# Patient Record
Sex: FEMALE | Race: WHITE | NOT HISPANIC OR LATINO | Employment: FULL TIME | URBAN - METROPOLITAN AREA
[De-identification: names, ages, dates, MRNs, and addresses within clinical notes are randomized per-mention and may not be internally consistent; named-entity substitution may affect disease eponyms.]

---

## 2022-11-19 ENCOUNTER — OFFICE VISIT (OUTPATIENT)
Dept: URGENT CARE | Facility: URGENT CARE | Age: 57
End: 2022-11-19
Payer: COMMERCIAL

## 2022-11-19 VITALS
TEMPERATURE: 98.2 F | WEIGHT: 248.7 LBS | SYSTOLIC BLOOD PRESSURE: 154 MMHG | HEART RATE: 77 BPM | OXYGEN SATURATION: 95 % | RESPIRATION RATE: 16 BRPM | DIASTOLIC BLOOD PRESSURE: 94 MMHG

## 2022-11-19 DIAGNOSIS — J01.90 ACUTE SINUSITIS WITH SYMPTOMS > 10 DAYS: Primary | ICD-10-CM

## 2022-11-19 DIAGNOSIS — B37.31 YEAST INFECTION OF THE VAGINA: ICD-10-CM

## 2022-11-19 DIAGNOSIS — J45.20 MILD INTERMITTENT ASTHMA, UNSPECIFIED WHETHER COMPLICATED: ICD-10-CM

## 2022-11-19 PROCEDURE — 99203 OFFICE O/P NEW LOW 30 MIN: CPT | Performed by: PHYSICIAN ASSISTANT

## 2022-11-19 RX ORDER — MONTELUKAST SODIUM 10 MG/1
10 TABLET ORAL
COMMUNITY
Start: 2021-10-06

## 2022-11-19 RX ORDER — FLUCONAZOLE 150 MG/1
150 TABLET ORAL
Qty: 2 TABLET | Refills: 0 | Status: SHIPPED | OUTPATIENT
Start: 2022-11-19 | End: 2022-11-23

## 2022-11-19 RX ORDER — NYSTATIN 100000/ML
500000 SUSPENSION, ORAL (FINAL DOSE FORM) ORAL
COMMUNITY
Start: 2021-10-06

## 2022-11-19 RX ORDER — AZELASTINE 1 MG/ML
SPRAY, METERED NASAL
COMMUNITY
Start: 2022-10-28

## 2022-11-19 RX ORDER — FLUCONAZOLE 150 MG/1
TABLET ORAL
COMMUNITY
Start: 2022-08-23

## 2022-11-19 RX ORDER — ESCITALOPRAM OXALATE 10 MG/1
10 TABLET ORAL DAILY
COMMUNITY
Start: 2022-10-28

## 2022-11-19 RX ORDER — OMEPRAZOLE 40 MG/1
CAPSULE, DELAYED RELEASE ORAL
COMMUNITY
Start: 2022-09-28

## 2022-11-19 RX ORDER — SUMATRIPTAN 5 MG/1
5 SPRAY NASAL
COMMUNITY
Start: 2021-10-06

## 2022-11-19 RX ORDER — METRONIDAZOLE 250 MG/1
250 TABLET ORAL
COMMUNITY
Start: 2021-10-06

## 2022-11-19 ASSESSMENT — PAIN SCALES - GENERAL: PAINLEVEL: NO PAIN (0)

## 2022-11-19 NOTE — PROGRESS NOTES
ASSESSMENT/PLAN:     (J01.90) Acute sinusitis with symptoms > 10 days  (primary encounter diagnosis)    MDM: Acute on chronic sinusitis.     Plan: amoxicillin-clavulanate (AUGMENTIN) 875-125 MG         tablet    -Augmentin antibiotic x 14 days   -Follow-up with primary care provider if no improvement after 5 days of antibiotics, if any sudden worsening, or if not fully resolved in the next 14 days   -Continue asthma prevention medication and as needed Albuterol rescue medication. Seek re-evaluation if any worsening of wheezing/asthma or if inadequate response to Albuterol   -Educational handout is placed in Epic         (B37.31) Yeast infection of the vagina    Comment: Patient reports long history of needed Diflucan for yeast vaginitis with use of antibiotics. She reports need of 2 doses.     Plan: fluconazole (DIFLUCAN) 150 MG tablet    -Diflucan prescription is provided   -Follow-up with primary care if any ongoing symptoms     (J45.20) Mild intermittent asthma, unspecified whether complicated    Plan:     -As per above       -----------------------        SUBJECTIVE:   Jing Velazco is a 57 year old female, with a past medical history that includes asthma, allergies and frequent sinus infections presenting to urgent care today for evaluation of suspected bacterial sinus infection.     HPI: patient reports she has been having increased mold allergy symptoms and some increased asthma symptoms for many weeks duration. Over the past 2 weeks or so, patient reports she has also developed progressive, purulent nasal congestion, purulent rhinorrhea, post-nasal drainage,sinus pain/pressure, and on/off facial pressure.     Symptoms are reported to be worsening despite use of allergy medications, steamer, and  Nettipot.       RESP HISTORY: Positive for asthma. Patient states she has a well defined asthma plan/states she feels is responding well to her daily asthma medication and as needed Albuterol (which has been 2-3  times daily for past 2 weeks).       ROS: No associated coughing up of blood, blue lips/fingers/toes,  abdominal pain, nausea, vomiting, diarrhea, severe body aches, severe headaches (but does have waxing and waning sinus headache) or other acute illness symptoms.       Past Medical History:   Diagnosis Date     ASTHMA - MILD INTERMITTENT 11/15/2005     Contact dermatitis and other eczema, due to unspecified cause      Diarrhea      HEADACHE 11/15/2005     Migraine, unspecified, without mention of intractable migraine without mention of status migrainosus      Rosacea        Patient Active Problem List   Diagnosis     Headache     Mild intermittent asthma     CARDIOVASCULAR SCREENING; LDL GOAL LESS THAN 160       Current Outpatient Medications   Medication     ALBUTEROL 90 MCG/ACT IN AERS     azelastine (ASTELIN) 0.1 % nasal spray     escitalopram (LEXAPRO) 10 MG tablet     fluconazole (DIFLUCAN) 150 MG tablet     metroNIDAZOLE (FLAGYL) 250 MG tablet     montelukast (SINGULAIR) 10 MG tablet     nystatin (MYCOSTATIN) 914514 UNIT/ML suspension     omeprazole (PRILOSEC) 40 MG DR capsule     SUMAtriptan (IMITREX) 5 MG/ACT nasal spray     No current facility-administered medications for this visit.       Allergies   Allergen Reactions     No Known Allergies             OBJECTIVE:   BP (!) 154/94 (BP Location: Right arm, Patient Position: Chair, Cuff Size: Adult Large)   Pulse 77   Temp 98.2  F (36.8  C) (Tympanic)   Resp 16   Wt 112.8 kg (248 lb 11.2 oz)   SpO2 95%       General appearance: alert and no apparent distress   Skin color is uniform in color and without rash.   HEENT:   Conjunctiva not injected. Sclera clear.   Left TM is normal: no effusions, no erythema, and normal landmarks.   Right TM is normal: no effusions, no erythema, and normal landmarks.   Nasal mucosa is red and swollen. Maxillary and frontal sinuses are mildly tender to finger tap percussion bilaterally.   Oropharyngeal exam is normal other  than evidence of post-nasal drip: no lesions, erythema, adenopathy or exudate. Uvula is midline. No trismus.  Neck is supple, full range of motion. No adenopathy. No lateral neck swelling.   CARDIAC:NORMAL - regular rate and rhythm without murmur.   RESP: No increased work of breathing. Patient is able to speak multiple sentences without pause today. Normal - CTA without rales, rhonchi, or wheezing.   NEURO: Alert and oriented.  Normal speech and mentation.  CN II/XII grossly intact.  Gait within normal limits.

## 2022-12-18 ENCOUNTER — HEALTH MAINTENANCE LETTER (OUTPATIENT)
Age: 57
End: 2022-12-18

## 2022-12-23 ENCOUNTER — NURSE TRIAGE (OUTPATIENT)
Dept: NURSING | Facility: CLINIC | Age: 57
End: 2022-12-23

## 2022-12-23 ENCOUNTER — VIRTUAL VISIT (OUTPATIENT)
Dept: FAMILY MEDICINE | Facility: CLINIC | Age: 57
End: 2022-12-23
Payer: COMMERCIAL

## 2022-12-23 DIAGNOSIS — J01.01 ACUTE RECURRENT MAXILLARY SINUSITIS: Primary | ICD-10-CM

## 2022-12-23 DIAGNOSIS — K21.9 GASTROESOPHAGEAL REFLUX DISEASE WITHOUT ESOPHAGITIS: ICD-10-CM

## 2022-12-23 DIAGNOSIS — J01.01 ACUTE RECURRENT MAXILLARY SINUSITIS: ICD-10-CM

## 2022-12-23 PROCEDURE — 99213 OFFICE O/P EST LOW 20 MIN: CPT | Mod: TEL | Performed by: PHYSICIAN ASSISTANT

## 2022-12-23 RX ORDER — LEVOFLOXACIN 750 MG/1
750 TABLET, FILM COATED ORAL DAILY
Qty: 7 TABLET | Refills: 0 | Status: SHIPPED | OUTPATIENT
Start: 2022-12-23 | End: 2022-12-23

## 2022-12-23 RX ORDER — LEVOFLOXACIN 750 MG/1
750 TABLET, FILM COATED ORAL DAILY
Qty: 7 TABLET | Refills: 0 | Status: SHIPPED | OUTPATIENT
Start: 2022-12-23

## 2022-12-23 ASSESSMENT — ASTHMA QUESTIONNAIRES
QUESTION_2 LAST FOUR WEEKS HOW OFTEN HAVE YOU HAD SHORTNESS OF BREATH: NOT AT ALL
QUESTION_4 LAST FOUR WEEKS HOW OFTEN HAVE YOU USED YOUR RESCUE INHALER OR NEBULIZER MEDICATION (SUCH AS ALBUTEROL): THREE OR MORE TIMES PER DAY
QUESTION_3 LAST FOUR WEEKS HOW OFTEN DID YOUR ASTHMA SYMPTOMS (WHEEZING, COUGHING, SHORTNESS OF BREATH, CHEST TIGHTNESS OR PAIN) WAKE YOU UP AT NIGHT OR EARLIER THAN USUAL IN THE MORNING: FOUR OR MORE NIGHTS A WEEK
QUESTION_5 LAST FOUR WEEKS HOW WOULD YOU RATE YOUR ASTHMA CONTROL: SOMEWHAT CONTROLLED
ACT_TOTALSCORE: 13
ACT_TOTALSCORE: 13
QUESTION_1 LAST FOUR WEEKS HOW MUCH OF THE TIME DID YOUR ASTHMA KEEP YOU FROM GETTING AS MUCH DONE AT WORK, SCHOOL OR AT HOME: SOME OF THE TIME

## 2022-12-23 NOTE — PROGRESS NOTES
Jing is a 57 year old who is being evaluated via a billable telephone visit.      What phone number would you like to be contacted at? [phone]  How would you like to obtain your AVS? MyChart    Distant Location (provider location):  Off-site    Assessment & Plan       ICD-10-CM    1. Acute recurrent maxillary sinusitis  J01.01 levofloxacin (LEVAQUIN) 750 MG tablet      2. Gastroesophageal reflux disease without esophagitis  K21.9         Patient with persistent bacterial sinusitis in setting of GERD. Will treat with broad-spectrum antibx to cover for any atypical organism. Take antibx as prescribed, even if sx improve. Continue use of humidifier, Neti pot, asthma medications, OTC expectorant to help with secretions. Get plenty of fluids and rest.    Return in about 3 days (around 12/26/2022), or if symptoms worsen or fail to improve.    TAMMY [facility]   Jing is a 57 year old, presenting for the following health issues:  Sinus Problem      History of Present Illness       Reason for visit:  Sinus infection, excessive mucus, temp, productive cough, diarrhea.    She eats 2-3 servings of fruits and vegetables daily.She consumes 0 sweetened beverage(s) daily.She exercises with enough effort to increase her heart rate 10 to 19 minutes per day.  She exercises with enough effort to increase her heart rate 3 or less days per week.   She is taking medications regularly.       - Patient seen for prolonged URI sx 11/19/2022 at . Took Augmentin x14 days without much relief. Continues to have thick, white mucus draining from sinuses; persistent cough and sinus pain/pressure. Had several days of GERD and reports reflux into her sinuses; developed fever (Tmax 102) for several days after and drainage became quite purulent. Has been using humidifier, Neti pot, and asthma medications without relief.    Review of Systems   Constitutional, HEENT, cardiovascular,  pulmonary, GI, , musculoskeletal, neuro, skin, endocrine and psych systems are negative, except as otherwise noted.      Objective           Vitals:  No vitals were obtained today due to virtual visit.    Physical Exam   alert and no distress  PSYCH: Alert and oriented times 3; coherent speech, normal   rate and volume, able to articulate logical thoughts, able   to abstract reason, no tangential thoughts, no hallucinations   or delusions  Her affect is normal  RESP: Frequent dry cough, mild hoarse voice, no audible wheezing, able to talk in full sentences  Remainder of exam unable to be completed due to telephone visits                Phone call duration: 7 minutes

## 2022-12-24 NOTE — TELEPHONE ENCOUNTER
[facility] Pharmacist Alina calling to request transfer of levofloxacin prescription which was sent to [facility] which is now closed. Pt is requesting transfer.    Writer gave verbal orders for prescription below to Alina.     levofloxacin  levofloxacin (LEVAQUIN) 750 MG tablet  Take 1 tablet (750 mg) by mouth daily for 7 days, Disp-7 tablet, R-0, E-Prescribe   Dispense: 7 tablet   Refills: 0 ordered   Pharmacy: [id number] - [place], [place] - [address] AT [address] (Ph: [phone])     Alina verbalizes understanding and no further questions of concerns at this time.     Reason for Disposition    Pharmacy calling with prescription question and triager answers question    Protocols used: MEDICATION QUESTION CALL-A-

## 2022-12-24 NOTE — TELEPHONE ENCOUNTER
Patient called.  States her Levaquin medication that was prescribed was sent to a [facility] that is closed.  Patient is requesting this medication to be sent to the [facility] pharmacy on [facility].    Prescription sent per patients request.    Angeline Gleason RN   12/23/22 7:22 PM  [facility] Nurse Advisor    Reason for Disposition    [1] Prescription prescribed recently is not at pharmacy AND [2] triager has access to patient's EMR AND [3] prescription is recorded in the EMR    Additional Information    Negative: [1] Intentional drug overdose AND [2] suicidal thoughts or ideas    Negative: Drug overdose and triager unable to answer question    Negative: Caller requesting a renewal or refill of a medicine patient is currently taking    Negative: Caller requesting information unrelated to medicine    Negative: Caller requesting information about COVID-19 Vaccine    Negative: Caller requesting information about Emergency Contraception    Negative: Caller requesting information about Combined Birth Control Pills    Negative: Caller requesting information about Progestin Birth Control Pills    Negative: Caller requesting information about Post-Op pain or medicines    Negative: Caller requesting a prescription antibiotic (such as Penicillin) for Strep throat and has a positive culture result    Negative: Caller requesting a prescription anti-viral med (such as Tamiflu) and has influenza (flu) symptoms    Negative: Immunization reaction suspected    Negative: Rash while taking a medicine or within 3 days of stopping it    Negative: [1] Asthma and [2] having symptoms of asthma (cough, wheezing, etc.)    Negative: [1] Symptom of illness (e.g., headache, abdominal pain, earache, vomiting) AND [2] more than mild    Negative: Breastfeeding questions about mother's medicines and diet    Negative: MORE THAN A DOUBLE DOSE of a prescription or over-the-counter (OTC) drug    Negative: [1] DOUBLE DOSE (an extra dose or lesser  amount) of prescription drug AND [2] any symptoms (e.g., dizziness, nausea, pain, sleepiness)    Negative: [1] DOUBLE DOSE (an extra dose or lesser amount) of over-the-counter (OTC) drug AND [2] any symptoms (e.g., dizziness, nausea, pain, sleepiness)    Negative: Took another person's prescription drug    Negative: [1] DOUBLE DOSE (an extra dose or lesser amount) of prescription drug AND [2] NO symptoms (Exception: a double dose of antibiotics)    Negative: Diabetes drug error or overdose (e.g., took wrong type of insulin or took extra dose)    Negative: [1] Prescription not at pharmacy AND [2] was prescribed by PCP recently (Exception: triager has access to EMR and prescription is recorded there. Go to Home Care and confirm for pharmacy.)    Negative: [1] Pharmacy calling with prescription question AND [2] triager unable to answer question    Negative: [1] Caller has URGENT medicine question about med that PCP or specialist prescribed AND [2] triager unable to answer question    Negative: Medicine patch causing local rash or itching    Negative: [1] Caller has medicine question about med NOT prescribed by PCP AND [2] triager unable to answer question (e.g., compatibility with other med, storage)    Negative: Prescription request for new medicine (not a refill)    Negative: [1] Caller has NON-URGENT medicine question about med that PCP prescribed AND [2] triager unable to answer question    Negative: Caller wants to use a complementary or alternative medicine    Protocols used: MEDICATION QUESTION CALL-A-

## 2023-04-04 ENCOUNTER — OFFICE VISIT (OUTPATIENT)
Dept: OBGYN | Facility: CLINIC | Age: 58
End: 2023-04-04
Payer: COMMERCIAL

## 2023-04-04 VITALS — HEIGHT: 64 IN | WEIGHT: 258 LBS | BODY MASS INDEX: 44.05 KG/M2

## 2023-04-04 DIAGNOSIS — Z01.419 ENCOUNTER FOR GYNECOLOGICAL EXAMINATION WITHOUT ABNORMAL FINDING: Primary | ICD-10-CM

## 2023-04-04 DIAGNOSIS — Z12.4 ENCOUNTER FOR PAPANICOLAOU SMEAR FOR CERVICAL CANCER SCREENING: ICD-10-CM

## 2023-04-04 PROCEDURE — 99386 PREV VISIT NEW AGE 40-64: CPT | Performed by: OBSTETRICS & GYNECOLOGY

## 2023-04-04 PROCEDURE — G0145 SCR C/V CYTO,THINLAYER,RESCR: HCPCS | Performed by: OBSTETRICS & GYNECOLOGY

## 2023-04-04 PROCEDURE — 87624 HPV HI-RISK TYP POOLED RSLT: CPT | Performed by: OBSTETRICS & GYNECOLOGY

## 2023-04-04 RX ORDER — FLUTICASONE PROPIONATE 50 MCG
2 SPRAY, SUSPENSION (ML) NASAL
COMMUNITY
Start: 2023-02-09

## 2023-04-04 RX ORDER — AMOXICILLIN 500 MG/1
CAPSULE ORAL
COMMUNITY
Start: 2022-06-17

## 2023-04-04 RX ORDER — FEXOFENADINE HCL 180 MG/1
180 TABLET ORAL DAILY
COMMUNITY

## 2023-04-04 RX ORDER — TOLTERODINE 4 MG/1
4 CAPSULE, EXTENDED RELEASE ORAL
COMMUNITY
Start: 2023-02-09

## 2023-04-04 RX ORDER — EPINEPHRINE 0.3 MG/.3ML
0.3 INJECTION SUBCUTANEOUS
COMMUNITY
Start: 2023-02-09

## 2023-04-04 RX ORDER — SUCRALFATE 1 G/1
1 TABLET ORAL EVERY 6 HOURS
COMMUNITY
Start: 2021-10-06

## 2023-04-04 RX ORDER — TRIAMCINOLONE ACETONIDE 1 MG/G
CREAM TOPICAL
COMMUNITY
Start: 2023-02-09 | End: 2024-06-14

## 2023-04-04 RX ORDER — IPRATROPIUM BROMIDE AND ALBUTEROL SULFATE 2.5; .5 MG/3ML; MG/3ML
3 SOLUTION RESPIRATORY (INHALATION)
COMMUNITY
Start: 2023-02-09 | End: 2024-02-09

## 2023-04-04 RX ORDER — FLUTICASONE PROPIONATE AND SALMETEROL XINAFOATE 230; 21 UG/1; UG/1
2 AEROSOL, METERED RESPIRATORY (INHALATION)
COMMUNITY
Start: 2023-02-09

## 2023-04-04 RX ORDER — PREDNISONE 20 MG/1
TABLET ORAL
COMMUNITY
Start: 2022-05-28

## 2023-04-04 NOTE — PROGRESS NOTES
Jing is a 58 year old P2. who presents for annual exam.   Postmenopausal.  She is having no menopausal symptoms. No vaginal bleeding noted. Menopausal since 2015.  Was briefly on HRT but stopped as asymptomatic and concerned about side effects/risks which is reasonable.    Besides routine health maintenance, she has no other health concerns today .  GYNECOLOGIC HISTORY:  She is sexually active with 1 male partner(s).  History sexually transmitted infections:No STD history  Estrogen replacement therapy: No    History of abnormal Pap smear: NO - age 30-65 PAP every 5 years with negative HPV co-testing recommended  Family history of breast CA: No  Family history of uterine/ovarian CA: No      HEALTH MAINTENANCE:  Reviewed.    HISTORY:  OB History   No obstetric history on file.     Past Medical History:   Diagnosis Date     ASTHMA - MILD INTERMITTENT 11/15/2005     Contact dermatitis and other eczema, due to unspecified cause      Diarrhea      HEADACHE 11/15/2005     Migraine, unspecified, without mention of intractable migraine without mention of status migrainosus      Rosacea      Past Surgical History:   Procedure Laterality Date     ZZC NONSPECIFIC PROCEDURE      left elbow fracture     Family History   Problem Relation Age of Onset     Cerebrovascular Disease Maternal Grandmother      Cancer - colorectal Maternal Grandmother      Cerebrovascular Disease Mother      Social History     Socioeconomic History     Marital status:      Spouse name: Eduardo     Number of children: 2     Years of education: 16     Highest education level: None   Occupational History     Occupation: teacher     Employer: [employer]   Tobacco Use     Smoking status: Never     Smokeless tobacco: Never   Substance and Sexual Activity     Alcohol use: No     Drug use: No     Sexual activity: Yes     Partners: Male       Current Outpatient Medications:      ALBUTEROL 90 MCG/ACT IN AERS, 1-2 puffs q 4-6 hours prn, Disp:  1, Rfl: 0     azelastine (ASTELIN) 0.1 % nasal spray, ADMINISTER 1 SPRAY INTO EACH NOSTRIL TWICE DAILY. USE IN EACH NOSTRIL AS DIRECTED, Disp: , Rfl:      escitalopram (LEXAPRO) 10 MG tablet, Take 10 mg by mouth daily, Disp: , Rfl:      fexofenadine (ALLEGRA) 180 MG tablet, Take 180 mg by mouth daily, Disp: , Rfl:      fluticasone (FLONASE) 50 MCG/ACT nasal spray, Spray 2 sprays in nostril, Disp: , Rfl:      fluticasone-salmeterol (ADVAIR HFA) 230-21 MCG/ACT inhaler, Inhale 2 puffs into the lungs, Disp: , Rfl:      ipratropium - albuterol 0.5 mg/2.5 mg/3 mL (DUONEB) 0.5-2.5 (3) MG/3ML neb solution, Inhale 3 mLs into the lungs, Disp: , Rfl:      montelukast (SINGULAIR) 10 MG tablet, Take 10 mg by mouth, Disp: , Rfl:      nystatin (MYCOSTATIN) 917588 UNIT/ML suspension, Take 500,000 Units by mouth, Disp: , Rfl:      omeprazole (PRILOSEC) 40 MG DR capsule, TAKE 1 CAPSULE(40 MG) BY MOUTH TWICE DAILY BEFORE BREAKFAST AND DINNER, Disp: , Rfl:      sucralfate (CARAFATE) 1 GM tablet, Take 1 g by mouth every 6 hours, Disp: , Rfl:      tolterodine ER (DETROL LA) 4 MG 24 hr capsule, Take 4 mg by mouth, Disp: , Rfl:      amoxicillin (AMOXIL) 500 MG capsule, TAKE ONE CAPSULE BY MOUTH THREE TIMES DAILY UNTIL ALL TAKEN (Patient not taking: Reported on 4/4/2023), Disp: , Rfl:      EPINEPHrine (ANY BX GENERIC EQUIV) 0.3 MG/0.3ML injection 2-pack, Inject 0.3 mg into the muscle (Patient not taking: Reported on 4/4/2023), Disp: , Rfl:      fluconazole (DIFLUCAN) 150 MG tablet, TAKE 1 TABLET(150 MG) BY MOUTH NOW AS DIRECTED. REPEAT IN 7 DAYS IF SYMPTOMS PERSIST (Patient not taking: Reported on 4/4/2023), Disp: , Rfl:      levofloxacin (LEVAQUIN) 750 MG tablet, Take 1 tablet (750 mg) by mouth daily (Patient not taking: Reported on 4/4/2023), Disp: 7 tablet, Rfl: 0     metroNIDAZOLE (FLAGYL) 250 MG tablet, Take 250 mg by mouth (Patient not taking: Reported on 4/4/2023), Disp: , Rfl:      predniSONE (DELTASONE) 20 MG tablet, TAKE 3 TABLETS  "BY MOUTH DAILY FOR 3 DAYS THEN TAKE 2 TABLETS BY MOUTH DAILY FOR 3 DAYS THEN TAKE 1 TABLET BY MOUTH DAILY FOR 3 DAYS (Patient not taking: Reported on 4/4/2023), Disp: , Rfl:      Pseudoephedrine-guaiFENesin (MUCINEX D PO), , Disp: , Rfl:      SUMAtriptan (IMITREX) 5 MG/ACT nasal spray, Spray 5 mg in nostril (Patient not taking: Reported on 4/4/2023), Disp: , Rfl:      triamcinolone (KENALOG) 0.1 % external cream, Apply to affected area 1-2 times daily as needed. (Patient not taking: Reported on 4/4/2023), Disp: , Rfl:   Allergies   Allergen Reactions     Lavender Oil Anaphylaxis     Peanut Butter Flavor Anaphylaxis     Mold Cough     No Known Allergies      Perfume Difficulty breathing     Pine Tar Difficulty breathing     Piper Difficulty breathing     Sesame Oil Difficulty breathing       Past medical, surgical, social and family history were reviewed and updated in EPIC.    ROS:  12 point review of systems negative other than symptoms noted below.      EXAM:  Ht 1.626 m (5' 4\")   Wt 117 kg (258 lb)   LMP 01/31/2020 (Approximate)   BMI 44.29 kg/m     BMI: Body mass index is 44.29 kg/m .  Constitutional: healthy, alert and no distress  Vulva:  No external lesions, normal female hair distribution, no inguinal adenopathy.    Urethra:  Midline, non-tender, well supported, no discharge  Vagina:  Atrophic, no abnormal discharge, no lesions  Cervix: no lesions, no discharge, multiparous and Pap obtained  Uterus:  anteverted, smooth contour, without enlargement, mobile, and without tenderness  Limited by body habitus  Ovaries:  No masses appreciated  Rectal Exam: deferred  Musculoskeletal: extremities normal  Skin: no suspicious lesions or rashes  Psychiatric: Affect appropriate, cooperative, mentation appears normal.     COUNSELING:   Reviewed preventive health counseling, as reflected in patient instructions   reports that she has never smoked. She has never used smokeless tobacco.      ASSESSMENT:  58 year old No " obstetric history on file. with satisfactory annual exam  (Z01.419) Encounter for gynecological examination without abnormal finding  (primary encounter diagnosis)  Comment: Normal exam  Plan:     (Z12.4) Encounter for Papanicolaou smear for cervical cancer screening  Comment:   Plan: Pap screen with HPV - recommended age 30 - 65         years          Discussed chronic vulvitis management if symptoms occur    Danny Oleary MD

## 2023-04-06 LAB
BKR LAB AP GYN ADEQUACY: NORMAL
BKR LAB AP GYN INTERPRETATION: NORMAL
BKR LAB AP HPV REFLEX: NORMAL
BKR LAB AP LMP: NORMAL
BKR LAB AP PREVIOUS ABNORMAL: NORMAL
PATH REPORT.COMMENTS IMP SPEC: NORMAL
PATH REPORT.COMMENTS IMP SPEC: NORMAL
PATH REPORT.RELEVANT HX SPEC: NORMAL

## 2023-04-07 LAB
HUMAN PAPILLOMA VIRUS 16 DNA: NEGATIVE
HUMAN PAPILLOMA VIRUS 18 DNA: NEGATIVE
HUMAN PAPILLOMA VIRUS FINAL DIAGNOSIS: NORMAL
HUMAN PAPILLOMA VIRUS OTHER HR: NEGATIVE

## 2024-06-16 ENCOUNTER — HEALTH MAINTENANCE LETTER (OUTPATIENT)
Age: 59
End: 2024-06-16

## 2024-07-18 ENCOUNTER — ORDERS ONLY (AUTO-RELEASED) (OUTPATIENT)
Dept: INTERNAL MEDICINE | Facility: CLINIC | Age: 59
End: 2024-07-18
Payer: COMMERCIAL

## 2024-07-18 ENCOUNTER — ANCILLARY PROCEDURE (OUTPATIENT)
Dept: CT IMAGING | Facility: CLINIC | Age: 59
End: 2024-07-18
Payer: COMMERCIAL

## 2024-07-18 ENCOUNTER — OFFICE VISIT (OUTPATIENT)
Dept: INTERNAL MEDICINE | Facility: CLINIC | Age: 59
End: 2024-07-18
Payer: COMMERCIAL

## 2024-07-18 VITALS
WEIGHT: 267 LBS | SYSTOLIC BLOOD PRESSURE: 148 MMHG | TEMPERATURE: 97.5 F | DIASTOLIC BLOOD PRESSURE: 78 MMHG | RESPIRATION RATE: 18 BRPM | OXYGEN SATURATION: 95 % | BODY MASS INDEX: 45.58 KG/M2 | HEIGHT: 64 IN | HEART RATE: 91 BPM

## 2024-07-18 DIAGNOSIS — R07.9 CHEST PAIN, UNSPECIFIED TYPE: Primary | ICD-10-CM

## 2024-07-18 DIAGNOSIS — R07.9 CHEST PAIN, UNSPECIFIED TYPE: ICD-10-CM

## 2024-07-18 DIAGNOSIS — R60.9 FLUID RETENTION: ICD-10-CM

## 2024-07-18 DIAGNOSIS — R93.89 ABNORMAL CHEST X-RAY: ICD-10-CM

## 2024-07-18 PROCEDURE — 250N000009 HC RX 250

## 2024-07-18 PROCEDURE — 250N000011 HC RX IP 250 OP 636

## 2024-07-18 PROCEDURE — G2211 COMPLEX E/M VISIT ADD ON: HCPCS

## 2024-07-18 PROCEDURE — 71260 CT THORAX DX C+: CPT

## 2024-07-18 PROCEDURE — 99214 OFFICE O/P EST MOD 30 MIN: CPT

## 2024-07-18 RX ORDER — FUROSEMIDE 20 MG
20 TABLET ORAL DAILY
Qty: 14 TABLET | Refills: 0 | Status: SHIPPED | OUTPATIENT
Start: 2024-07-18 | End: 2024-08-01

## 2024-07-18 RX ORDER — IOPAMIDOL 755 MG/ML
80 INJECTION, SOLUTION INTRAVASCULAR ONCE
Status: COMPLETED | OUTPATIENT
Start: 2024-07-18 | End: 2024-07-18

## 2024-07-18 RX ADMIN — IOPAMIDOL 80 ML: 755 INJECTION, SOLUTION INTRAVENOUS at 14:44

## 2024-07-18 RX ADMIN — SODIUM CHLORIDE 80 ML: 9 INJECTION, SOLUTION INTRAVENOUS at 14:44

## 2024-07-18 ASSESSMENT — ASTHMA QUESTIONNAIRES
QUESTION_5 LAST FOUR WEEKS HOW WOULD YOU RATE YOUR ASTHMA CONTROL: POORLY CONTROLLED
QUESTION_3 LAST FOUR WEEKS HOW OFTEN DID YOUR ASTHMA SYMPTOMS (WHEEZING, COUGHING, SHORTNESS OF BREATH, CHEST TIGHTNESS OR PAIN) WAKE YOU UP AT NIGHT OR EARLIER THAN USUAL IN THE MORNING: ONCE OR TWICE
QUESTION_1 LAST FOUR WEEKS HOW MUCH OF THE TIME DID YOUR ASTHMA KEEP YOU FROM GETTING AS MUCH DONE AT WORK, SCHOOL OR AT HOME: A LITTLE OF THE TIME
ACT_TOTALSCORE: 15
QUESTION_4 LAST FOUR WEEKS HOW OFTEN HAVE YOU USED YOUR RESCUE INHALER OR NEBULIZER MEDICATION (SUCH AS ALBUTEROL): THREE OR MORE TIMES PER DAY
QUESTION_2 LAST FOUR WEEKS HOW OFTEN HAVE YOU HAD SHORTNESS OF BREATH: ONCE OR TWICE A WEEK
ACT_TOTALSCORE: 15

## 2024-07-18 NOTE — PATIENT INSTRUCTIONS
Minna Charles is the Same Day/Acute provider for the [place] [facility] department.   -She is available Tuesday-Friday for acute or new conditions.   -Acute issues include but not limited to: Urinary symptoms, new illness, dizziness, heart palpitations, Pre-op, ER/UC/Hosp follow up, Blood pressure concerns, uncontrolled anxiety/depression, etc.   -She does not establish care or do preventative visits.   -Please continue to see your PCP for preventative visits as well as chronic condition management.   Consider seeing Minna for acute issues before going to UC or the ER.   When calling [phone], please ask to be transferred to the Internal medicine clinic in [place] to get scheduled.

## 2024-07-18 NOTE — PROGRESS NOTES
Assessment & Plan     (R07.9) Chest pain, unspecified type  (primary encounter diagnosis)  Comment: Patient presents to the clinic for an emergency room follow-up regarding chest pain and shortness of breath.  Patient states that the pain is not better however her troponins and her EKG were within normal limits.  Given her ongoing pain we will do a Zio patch for her to wear for 7 days.  I would also like to get a stat chest CT to ensure she is not having congestive heart failure.  In the meantime we will get patient on Lasix 20 mg once daily for the next 2 weeks to see if her symptoms improve.  Will also refer patient to cardiology for further evaluation.  Plan: Adult Cardiology Eval  Referral, ZIO         PATCH MAIL OUT, CT Chest w Contrast, furosemide        (LASIX) 20 MG tablet, CANCELED: CT Chest w         Contrast        Referral placed.  Zio patch ordered, CT pending.  Medication sent to pharmacy.    (R93.89) Abnormal chest x-ray  Comment: Patient's chest x-ray in the emergency room showed shadowing over the right breast nipple.  Given that finding we will get a diagnostic mammogram for further evaluation.  Will also obtain a stat chest CT given her other symptoms.  Plan: MA Diagnostic Digital Bilateral, CT Chest w         Contrast, CANCELED: CT Chest w Contrast        Orders placed.    (R60.9) Fluid retention  Comment: Patient reports some shortness of breath and chest pain.  She also states she has some fluid retention and bilateral lower extremities.  Will start her on Lasix 20 mg once daily for 2 weeks.  Have patient return to the clinic for further evaluation.    Plan: CT Chest w Contrast, furosemide (LASIX) 20 MG         tablet, CANCELED: CT Chest w Contrast        Imaging pending medication sent to pharmacy.        MED REC REQUIRED  Post Medication Reconciliation Status:     BMI  Estimated body mass index is 45.47 kg/m  as calculated from the following:    Height as of this encounter: 1.632 m  "(5' 4.25\").    Weight as of this encounter: 121.1 kg (267 lb).   Weight management plan: Patient was referred to their PCP to discuss a diet and exercise plan.          Sandro Ch is a 59 year old, presenting for the following health issues:  left-sided chest pain radiating to her left arm, left shoulder and left back, left jaw with associated shortness of breath and  gasping  recently. She noticed 1 episode of chest pain 5 days ago. Today she was pushing a grocery cart and had to stop pushing the grocery cart because the severity of the chest pain and the radiation. She had associated  strange  feelings to radiating down into her right arm. Feels different than her asthma. She feels swollen and \"tight.\" She states that her normal blood pressure is 120/80. She endorses very bad GERD type symptoms recently and ate tacos today prior to this sensation. Denies any previous MI, denies any previous PE or DVT, denies any recent surgery, denies any history of malignancy or recent travels. Denies any pain in the lower extremities. Denies any cough fevers or chills     Ever since Friday, she has had some weird feelings over her chest and left shoulder and down her left arm. She has tried prednisone and epi, but no relief. She also has been very winded and having troubles breathing.   She knows this is different than asthma.   She has tried nebulizer and inhalers.   Weight gain- gradually increased over the years.   She also has fluid retention and feeling  leg tightness. She usually takes a diuretic OTC to help with that.     History of cancer: none       EKG and labs unremarkable.   Was advised to follow-up with primary care for further cardiac work-up     ER F/U      7/18/2024    10:07 AM   Additional Questions   Roomed by Daisy FRANCE CMA   Accompanied by Eduardo,      HPI       ED/UC Followup:    Facility:  [facility] ED  Date of visit: 7/17/2024  Reason for visit: chest pain  Current Status: staying the " "same        Review of Systems  Constitutional, HEENT, cardiovascular, pulmonary, gi and gu systems are negative, except as otherwise noted.      Objective    BP (!) 148/78 (BP Location: Left arm, Patient Position: Sitting, Cuff Size: Adult Large)   Pulse 91   Temp 97.5  F (36.4  C) (Tympanic)   Resp 18   Ht 1.632 m (5' 4.25\")   Wt 121.1 kg (267 lb)   LMP 01/31/2020 (Approximate)   SpO2 95%   Breastfeeding No   BMI 45.47 kg/m    Body mass index is 45.47 kg/m .  Physical Exam   GENERAL: alert and no distress  EYES: Eyes grossly normal to inspection, PERRL and conjunctivae and sclerae normal  HENT: ear canals and TM's normal, nose and mouth without ulcers or lesions  NECK: no adenopathy, no asymmetry, masses, or scars  RESP: lungs clear to auscultation - no rales, rhonchi or wheezes  CV: regular rate and rhythm, normal S1 S2, no S3 or S4, no murmur, click or rub, no peripheral edema  ABDOMEN: soft, nontender, no hepatosplenomegaly, no masses and bowel sounds normal  MS: no gross musculoskeletal defects noted, no edema            Signed Electronically by: ALEJANDRA Tabor CNP    "

## 2024-07-19 ENCOUNTER — TELEPHONE (OUTPATIENT)
Dept: CARDIOLOGY | Facility: CLINIC | Age: 59
End: 2024-07-19
Payer: COMMERCIAL

## 2024-07-19 NOTE — TELEPHONE ENCOUNTER
PT saw Minna yesterday and had CT scan done stat. Pt was already in the ED for her symptoms on 7/17.   Will route to Minna for further advise, if OK to wait until 8/21 for appt?

## 2024-07-19 NOTE — TELEPHONE ENCOUNTER
Health Call Center    Phone Message    May a detailed message be left on voicemail: yes     Reason for Call: Other: Jing's appointment with Dr. Welch was pushed out to 8/21, but Jing is stating she needs to be seen sooner due to fatigue, intermittent breathing issues, and fluid build-up.  Please call her back to further discuss.     Action Taken: Other: Cardiology    Travel Screening: Not Applicable    Thank you!  [facility]

## 2024-07-19 NOTE — TELEPHONE ENCOUNTER
1st attempt- Sent MyChart for the patient to call back and schedule the following:    Appointment type:  New Cardiology  Provider:  ANY  Return date:  3-4 weeks   Additional appointment(s) needed:    Additonal Notes:  Pt has a zio patch she needs to complete- it has been mailed out. She needs to r/s her cardio consult for 3-4 weeks from now   Specialty phone number: [phone]   MK

## 2024-07-19 NOTE — TELEPHONE ENCOUNTER
Shannon from cardiology is calling to report patient has appt with Dr. Welch 8/21/24, and they do have a sooner availability with Dr. Trevino on 8/14/24.    Cardiology believes there is a benefit to have patient wait to be seen after zio patch results.    Per patient's symptoms listed below by central scheduling - pt should be triaged. Red flag symptoms - please call patient to triage.    Cardiology reports if Minna believes pt needs to be seen urgently by cardiology instead of being reassess - needs to be reflected in cardiology referral.     Please call patient

## 2024-07-22 ENCOUNTER — TELEPHONE (OUTPATIENT)
Dept: INTERNAL MEDICINE | Facility: CLINIC | Age: 59
End: 2024-07-22
Payer: COMMERCIAL

## 2024-07-22 ENCOUNTER — NURSE TRIAGE (OUTPATIENT)
Dept: CARDIOLOGY | Facility: CLINIC | Age: 59
End: 2024-07-22
Payer: COMMERCIAL

## 2024-07-22 ENCOUNTER — TELEPHONE (OUTPATIENT)
Dept: CARDIOLOGY | Facility: CLINIC | Age: 59
End: 2024-07-22
Payer: COMMERCIAL

## 2024-07-22 NOTE — TELEPHONE ENCOUNTER
"Patient seen in clinic 7/18/24 to follow up on 7/17/24 ED visit for chest pain.  Patient states she was not having chest pain but rather chest pressure which has fully resolved.  She denies pain in back, arms, neck or jaw. Patient also denies any shortness of breath today.    Patient calls today to report that her bilateral lower extremity edema has worsened.  She has had edema in lower legs intermittently for years but it became significantly worse 7/17/24.  Ankles and legs \"are just huge\" without redness, pain, weeping or open areas. Legs \"feel heavy\" and patient feels her upper legs may also look \"thicker\". Patient uses a pump on legs while she works to help edema, she also elevates feet as much as possible and follows a low salt diet.  Patient works from home and sits in recliner a lot elevating feet. Encouraged to weigh herself daily, patient states scale needs a battery but will start checking weights daily as soon as she gets scale working.     Patient is using Lasix 20 mg per day and will be on this a total of 14 days.  Patient states she has noticed not improvement at all in lower extremity edema.  She is asking if she should be on a higher Lasix dose.  SONIA New R.N.    "

## 2024-07-22 NOTE — TELEPHONE ENCOUNTER
If symptoms do not improve, she should be seen by her primary or one of her PCP's partners. It does not look like her PCP is at this clinic

## 2024-07-22 NOTE — TELEPHONE ENCOUNTER
Patient called reporting red-flag symptom: Patient calling back with worsening leg swelling    Per the P Red-Flag symptom list, patient was: warm transferred to Triage Nurse

## 2024-07-22 NOTE — TELEPHONE ENCOUNTER
Call to pt and advised. She states the Lasix is kicking in. Is urinating a lot.   She verbalized understanding.

## 2024-07-22 NOTE — TELEPHONE ENCOUNTER
"Patient spoke to Triage regarding leg swelling.  Patient was in the ED 7/17/24 for left-sided chest pain. Patient states she had a 10lb weight gain of fluid at this time. She feels the swelling is more today than before. She states she has \"horrible\" shortness of breath and has no energy. She does not endorse having chest pain. She states her arms are \"bugging\" her the last few days. Patient wonders if she was prescribed enough lasix. She is currently on lasix 20mg for 14 days according to her med list. Patient reports her O2 sat is 98%. Patient is not established with our clinic. Per ED instructions, they advised her to follow-up with her PCP. Patient states her PCP retired. Patient states she has a family history with heart issues. She is currently scheduled to see Dr. Welch on 8/21/24 but was told she needed a Zio Patch first.  Patient was advised if she is still symptomatic/developing more symptoms, she needs to re-present to the ED. Patient verbalized understanding but wanted to be transferred to [facility] to see if she can schedule sooner appointments. Patient was transferred to our main [facility] number.     1. ONSET: \"When did the swelling start?\" (e.g., minutes, hours, days) Since ED visit 7/17/24.  2. LOCATION: \"What part of the leg is swollen?\" \"Are both legs swollen or just one leg?\" Bilateral.  3. SEVERITY: \"How bad is the swelling?\" (e.g., localized; mild, moderate, severe) Moderate.  - Localized: Small area of swelling localized to one leg.  - MILD pedal edema: Swelling limited to foot and ankle, pitting edema < 1/4 inch (6 mm) deep, rest and elevation eliminate most or all swelling.  - MODERATE edema: Swelling of lower leg to knee, pitting edema > 1/4 inch (6 mm) deep, rest and elevation only partially reduce swelling.  - SEVERE edema: Swelling extends above knee, facial or hand swelling present.  4. REDNESS: \"Does the swelling look red or infected?\" Not reported.  5. PAIN: \"Is the swelling painful to touch?\" " "If Yes, ask: \"How painful is it?\" (Scale 1-10; mild, moderate or severe) Not reported.  6. FEVER: \"Do you have a fever?\" If Yes, ask: \"What is it, how was it measured, and when did it start?\" Not reported.  7. CAUSE: \"What do you think is causing the leg swelling?\" Cardiac.  8. MEDICAL HISTORY: \"Do you have a history of blood clots (e.g., DVT), cancer, heart failure, kidney disease, or liver failure?\" Chest pain.  9. RECURRENT SYMPTOM: \"Have you had leg swelling before?\" If Yes, ask: \"When was the last time?\" \"What happened that time?\"  10. OTHER SYMPTOMS: \"Do you have any other symptoms?\" (e.g., chest pain, difficulty breathing) No energy. SOB.   11. PREGNANCY: \"Is there any chance you are pregnant?\" \"When was your last menstrual period?\"       "

## 2024-07-23 NOTE — TELEPHONE ENCOUNTER
Call to pt and discussed, offered possible ADS appointment. She states she has appt next Friday with the [facility] and is OK waiting. She states she was up all night peeing clear urine and so the lasix is working.   She is feeling better. She appreciates Minna seeing her and helping her.   Advised ED if symptoms worsen. She agrees with this.

## 2024-07-23 NOTE — TELEPHONE ENCOUNTER
See other encounter. Pt has appt at the [facility] next Friday. She is feeling better and urinating a lot. She is appreciative that Minna has been so helpful.

## 2024-07-24 ENCOUNTER — OFFICE VISIT (OUTPATIENT)
Dept: URGENT CARE | Facility: URGENT CARE | Age: 59
End: 2024-07-24
Payer: COMMERCIAL

## 2024-07-24 VITALS
WEIGHT: 266 LBS | SYSTOLIC BLOOD PRESSURE: 128 MMHG | HEART RATE: 83 BPM | TEMPERATURE: 98.4 F | BODY MASS INDEX: 45.3 KG/M2 | OXYGEN SATURATION: 96 % | DIASTOLIC BLOOD PRESSURE: 77 MMHG

## 2024-07-24 DIAGNOSIS — R60.0 BILATERAL LEG EDEMA: Primary | ICD-10-CM

## 2024-07-24 LAB
ALBUMIN SERPL-MCNC: 3.9 G/DL (ref 3.4–5)
ALP SERPL-CCNC: 67 U/L (ref 40–150)
ALT SERPL W P-5'-P-CCNC: 50 U/L (ref 0–50)
ANION GAP SERPL CALCULATED.3IONS-SCNC: 12 MMOL/L (ref 3–14)
AST SERPL W P-5'-P-CCNC: 47 U/L (ref 0–45)
BILIRUB SERPL-MCNC: 0.5 MG/DL (ref 0.2–1.3)
BUN SERPL-MCNC: 9 MG/DL (ref 7–30)
CALCIUM SERPL-MCNC: 10 MG/DL (ref 8.5–10.1)
CHLORIDE BLD-SCNC: 102 MMOL/L (ref 94–109)
CO2 SERPL-SCNC: 29 MMOL/L (ref 20–32)
CREAT SERPL-MCNC: 0.8 MG/DL (ref 0.52–1.04)
EGFRCR SERPLBLD CKD-EPI 2021: 84 ML/MIN/1.73M2
GLUCOSE BLD-MCNC: 137 MG/DL (ref 70–99)
POTASSIUM BLD-SCNC: 4.2 MMOL/L (ref 3.4–5.3)
PROT SERPL-MCNC: 7.5 G/DL (ref 6.8–8.8)
SODIUM SERPL-SCNC: 143 MMOL/L (ref 135–145)

## 2024-07-24 PROCEDURE — 99214 OFFICE O/P EST MOD 30 MIN: CPT | Performed by: FAMILY MEDICINE

## 2024-07-24 PROCEDURE — 36415 COLL VENOUS BLD VENIPUNCTURE: CPT | Performed by: FAMILY MEDICINE

## 2024-07-24 PROCEDURE — 80053 COMPREHEN METABOLIC PANEL: CPT | Performed by: FAMILY MEDICINE

## 2024-07-24 RX ORDER — FUROSEMIDE 20 MG
30 TABLET ORAL DAILY
Qty: 30 TABLET | Refills: 0 | Status: SHIPPED | OUTPATIENT
Start: 2024-07-24

## 2024-07-24 NOTE — PROGRESS NOTES
"SUBJECTIVE:  Chief Complaint   Patient presents with    Hypertension     58 yo F presents with the following complaint    Urgent Care     Swelling. Went to the ER on Wednesday at [facility]. Pressure and pain on the arm and shoulder blade. Feeling super winded. 3x a day has been gasping a lot. Has cardiology appt on Friday at [facility]. Started new BP medication on Thursday.      Jing Velazco is a 59 year old female who presents with a chief complaint of persistent swelling lower legs, SOB.    Reviewed triage nurse on 7/22:  Patient seen in clinic 7/18/24 to follow up on 7/17/24 ED visit for chest pain.  Patient states she was not having chest pain but rather chest pressure which has fully resolved.  She denies pain in back, arms, neck or jaw. Patient also denies any shortness of breath today.     Patient calls today to report that her bilateral lower extremity edema has worsened.  She has had edema in lower legs intermittently for years but it became significantly worse 7/17/24.  Ankles and legs \"are just huge\" without redness, pain, weeping or open areas. Legs \"feel heavy\" and patient feels her upper legs may also look \"thicker\". Patient uses a pump on legs while she works to help edema, she also elevates feet as much as possible and follows a low salt diet.  Patient works from home and sits in recliner a lot elevating feet. Encouraged to weigh herself daily, patient states scale needs a battery but will start checking weights daily as soon as she gets scale working.      Patient is using Lasix 20 mg per day and will be on this a total of 14 days.  Patient states she has noticed not improvement at all in lower extremity edema.  She is asking if she should be on a higher Lasix dose    Was seen last week in ER due to chest pressure, had follow up in primary clinic the following day.  Has been feeling more fatigue, more SOB.  Had tried using albuterol and does help but states that current symptoms is not the same for " asthma flare.  Triggers for asthma is more humidity or odors, environmental.    Weight usually 252  Weight last week on 7/18 267    Past Medical History:   Diagnosis Date    ASTHMA - MILD INTERMITTENT 11/15/2005    Contact dermatitis and other eczema, due to unspecified cause     Diarrhea     HEADACHE 11/15/2005    Migraine, unspecified, without mention of intractable migraine without mention of status migrainosus     Rosacea      Current Outpatient Medications   Medication Sig Dispense Refill    ALBUTEROL 90 MCG/ACT IN AERS 1-2 puffs q 4-6 hours prn 1 0    azelastine (ASTELIN) 0.1 % nasal spray ADMINISTER 1 SPRAY INTO EACH NOSTRIL TWICE DAILY. USE IN EACH NOSTRIL AS DIRECTED      EPINEPHrine (ANY BX GENERIC EQUIV) 0.3 MG/0.3ML injection 2-pack Inject 0.3 mg into the muscle      escitalopram (LEXAPRO) 10 MG tablet Take 10 mg by mouth daily      fexofenadine (ALLEGRA) 180 MG tablet Take 180 mg by mouth daily      fluticasone (FLONASE) 50 MCG/ACT nasal spray Spray 2 sprays in nostril      fluticasone-salmeterol (ADVAIR HFA) 230-21 MCG/ACT inhaler Inhale 2 puffs into the lungs      furosemide (LASIX) 20 MG tablet Take 1 tablet (20 mg) by mouth daily for 14 days 14 tablet 0    montelukast (SINGULAIR) 10 MG tablet Take 10 mg by mouth      nystatin (MYCOSTATIN) 131603 UNIT/ML suspension Take 500,000 Units by mouth      omeprazole (PRILOSEC) 40 MG DR capsule TAKE 1 CAPSULE(40 MG) BY MOUTH TWICE DAILY BEFORE BREAKFAST AND DINNER      sucralfate (CARAFATE) 1 GM tablet Take 1 g by mouth every 6 hours      SUMAtriptan (IMITREX) 5 MG/ACT nasal spray Spray 5 mg in nostril      tolterodine ER (DETROL LA) 4 MG 24 hr capsule Take 4 mg by mouth      amoxicillin (AMOXIL) 500 MG capsule TAKE ONE CAPSULE BY MOUTH THREE TIMES DAILY UNTIL ALL TAKEN (Patient not taking: Reported on 4/4/2023)      fluconazole (DIFLUCAN) 150 MG tablet       ipratropium - albuterol 0.5 mg/2.5 mg/3 mL (DUONEB) 0.5-2.5 (3) MG/3ML neb solution Inhale 3 mLs into  the lungs      levofloxacin (LEVAQUIN) 750 MG tablet Take 1 tablet (750 mg) by mouth daily (Patient not taking: Reported on 4/4/2023) 7 tablet 0    metroNIDAZOLE (FLAGYL) 250 MG tablet Take 250 mg by mouth (Patient not taking: Reported on 4/4/2023)      predniSONE (DELTASONE) 20 MG tablet TAKE 3 TABLETS BY MOUTH DAILY FOR 3 DAYS THEN TAKE 2 TABLETS BY MOUTH DAILY FOR 3 DAYS THEN TAKE 1 TABLET BY MOUTH DAILY FOR 3 DAYS (Patient not taking: Reported on 4/4/2023)      Pseudoephedrine-guaiFENesin (MUCINEX D PO)  (Patient not taking: Reported on 4/4/2023)       Social History     Tobacco Use    Smoking status: Never    Smokeless tobacco: Never   Substance Use Topics    Alcohol use: No       ROS:  Review of systems negative except as stated above.    EXAM:   /77 (BP Location: Right arm)   Pulse 83   Temp 98.4  F (36.9  C) (Tympanic)   LMP 01/31/2020 (Approximate)   SpO2 96%   GENERAL APPEARANCE: healthy, alert and no distress  EXTREMITIES: peripheral pulses normal, bilateral lower edema 1-2+  PSYCH:alert, affect bright    Results for orders placed or performed in visit on 07/24/24   Comprehensive metabolic panel     Status: Abnormal   Result Value Ref Range    Sodium 143 135 - 145 mmol/L    Potassium 4.2 3.4 - 5.3 mmol/L    Chloride 102 94 - 109 mmol/L    Carbon Dioxide (CO2) 29 20 - 32 mmol/L    Anion Gap 12 3 - 14 mmol/L    Urea Nitrogen 9 7 - 30 mg/dL    Creatinine 0.80 0.52 - 1.04 mg/dL    GFR Estimate 84 >60 mL/min/1.73m2    Calcium 10.0 8.5 - 10.1 mg/dL    Glucose 137 (H) 70 - 99 mg/dL    Alkaline Phosphatase 67 40 - 150 U/L    AST 47 (H) 0 - 45 U/L    ALT 50 0 - 50 U/L    Protein Total 7.5 6.8 - 8.8 g/dL    Albumin 3.9 3.4 - 5.0 g/dL    Bilirubin Total 0.5 0.2 - 1.3 mg/dL         ASSESSMENT/PLAN:  (R60.0) Bilateral leg edema  (primary encounter diagnosis)  Plan: Comprehensive metabolic panel, furosemide         (LASIX) 20 MG tablet            Still has edema, tolerating lasix and will increase slightly to  lasix 30 mg daily.  Suspect that may tolerate 40 mg daily and okay to try for a few days if desires.  Monitor weight daily    Follow up with cardiology next week for chest pressure concerns, reviewed that cardiology or primary provider should be titrating lasix dose.    Laron Fernández MD  July 24, 2024 5:34 PM

## 2024-07-24 NOTE — PATIENT INSTRUCTIONS
Okay to increase lasix 30 mg daily, if not improving, then okay to try lasix 40 mg daily for 2-3 days    Follow up with primary provider  Follow up with cardiology

## 2024-08-08 PROCEDURE — 93244 EXT ECG>48HR<7D REV&INTERPJ: CPT | Performed by: INTERNAL MEDICINE

## 2024-12-11 ENCOUNTER — OFFICE VISIT (OUTPATIENT)
Dept: PEDIATRICS | Facility: CLINIC | Age: 59
End: 2024-12-11
Payer: COMMERCIAL

## 2024-12-11 VITALS
SYSTOLIC BLOOD PRESSURE: 132 MMHG | BODY MASS INDEX: 42.56 KG/M2 | RESPIRATION RATE: 18 BRPM | OXYGEN SATURATION: 98 % | HEART RATE: 78 BPM | DIASTOLIC BLOOD PRESSURE: 80 MMHG | WEIGHT: 249.9 LBS | TEMPERATURE: 97.7 F

## 2024-12-11 DIAGNOSIS — N39.0 ACUTE UTI (URINARY TRACT INFECTION): ICD-10-CM

## 2024-12-11 DIAGNOSIS — R30.0 DYSURIA: Primary | ICD-10-CM

## 2024-12-11 LAB
ALBUMIN UR-MCNC: 100 MG/DL
APPEARANCE UR: CLEAR
BACTERIA #/AREA URNS HPF: ABNORMAL /HPF
BILIRUB UR QL STRIP: NEGATIVE
COLOR UR AUTO: YELLOW
GLUCOSE UR STRIP-MCNC: NEGATIVE MG/DL
HGB UR QL STRIP: ABNORMAL
KETONES UR STRIP-MCNC: NEGATIVE MG/DL
LEUKOCYTE ESTERASE UR QL STRIP: ABNORMAL
NITRATE UR QL: NEGATIVE
PH UR STRIP: 8.5 [PH] (ref 5–7)
RBC #/AREA URNS AUTO: ABNORMAL /HPF
SP GR UR STRIP: 1.01 (ref 1–1.03)
SQUAMOUS #/AREA URNS AUTO: ABNORMAL /LPF
UROBILINOGEN UR STRIP-ACNC: 0.2 E.U./DL
WBC #/AREA URNS AUTO: ABNORMAL /HPF
WBC CLUMPS #/AREA URNS HPF: PRESENT /HPF

## 2024-12-11 PROCEDURE — 87186 SC STD MICRODIL/AGAR DIL: CPT | Performed by: INTERNAL MEDICINE

## 2024-12-11 PROCEDURE — 99213 OFFICE O/P EST LOW 20 MIN: CPT | Performed by: INTERNAL MEDICINE

## 2024-12-11 PROCEDURE — G2211 COMPLEX E/M VISIT ADD ON: HCPCS | Performed by: INTERNAL MEDICINE

## 2024-12-11 PROCEDURE — 81001 URINALYSIS AUTO W/SCOPE: CPT | Performed by: INTERNAL MEDICINE

## 2024-12-11 RX ORDER — BACLOFEN 20 MG
1500 TABLET ORAL 3 TIMES DAILY
COMMUNITY

## 2024-12-11 RX ORDER — ISOSORBIDE MONONITRATE 30 MG/1
30 TABLET, EXTENDED RELEASE ORAL DAILY
COMMUNITY

## 2024-12-11 RX ORDER — ACETAMINOPHEN 325 MG/1
325-650 TABLET ORAL EVERY 6 HOURS PRN
COMMUNITY

## 2024-12-11 RX ORDER — CLOPIDOGREL BISULFATE 75 MG/1
75 TABLET ORAL DAILY
COMMUNITY

## 2024-12-11 RX ORDER — CARVEDILOL 3.12 MG/1
3.12 TABLET ORAL 2 TIMES DAILY WITH MEALS
COMMUNITY

## 2024-12-11 RX ORDER — ASPIRIN 81 MG/1
81 TABLET ORAL DAILY
COMMUNITY

## 2024-12-11 RX ORDER — NITROFURANTOIN 25; 75 MG/1; MG/1
100 CAPSULE ORAL 2 TIMES DAILY
Qty: 10 CAPSULE | Refills: 0 | Status: SHIPPED | OUTPATIENT
Start: 2024-12-11 | End: 2024-12-16

## 2024-12-11 RX ORDER — NITROFURANTOIN 25; 75 MG/1; MG/1
100 CAPSULE ORAL 2 TIMES DAILY
Status: CANCELLED | OUTPATIENT
Start: 2024-12-11

## 2024-12-11 RX ORDER — FLUCONAZOLE 150 MG/1
150 TABLET ORAL ONCE
Qty: 2 TABLET | Refills: 0 | Status: SHIPPED | OUTPATIENT
Start: 2024-12-11 | End: 2024-12-11

## 2024-12-11 RX ORDER — LORATADINE 10 MG/1
10 TABLET ORAL DAILY
COMMUNITY

## 2024-12-11 RX ORDER — LEVOCETIRIZINE DIHYDROCHLORIDE 5 MG/1
5 TABLET, FILM COATED ORAL EVERY EVENING
COMMUNITY

## 2024-12-11 RX ORDER — ALBUTEROL SULFATE 90 UG/1
2 INHALANT RESPIRATORY (INHALATION) EVERY 6 HOURS PRN
COMMUNITY

## 2024-12-11 RX ORDER — ROSUVASTATIN CALCIUM 40 MG/1
40 TABLET, COATED ORAL DAILY
COMMUNITY

## 2024-12-11 RX ORDER — NITROGLYCERIN 0.4 MG/1
0.4 TABLET SUBLINGUAL EVERY 5 MIN PRN
COMMUNITY

## 2024-12-11 ASSESSMENT — ASTHMA QUESTIONNAIRES
ACT_TOTALSCORE: 21
QUESTION_4 LAST FOUR WEEKS HOW OFTEN HAVE YOU USED YOUR RESCUE INHALER OR NEBULIZER MEDICATION (SUCH AS ALBUTEROL): ONE OR TWO TIMES PER DAY
QUESTION_3 LAST FOUR WEEKS HOW OFTEN DID YOUR ASTHMA SYMPTOMS (WHEEZING, COUGHING, SHORTNESS OF BREATH, CHEST TIGHTNESS OR PAIN) WAKE YOU UP AT NIGHT OR EARLIER THAN USUAL IN THE MORNING: NOT AT ALL
QUESTION_2 LAST FOUR WEEKS HOW OFTEN HAVE YOU HAD SHORTNESS OF BREATH: NOT AT ALL
ACT_TOTALSCORE: 21
QUESTION_1 LAST FOUR WEEKS HOW MUCH OF THE TIME DID YOUR ASTHMA KEEP YOU FROM GETTING AS MUCH DONE AT WORK, SCHOOL OR AT HOME: NONE OF THE TIME
QUESTION_5 LAST FOUR WEEKS HOW WOULD YOU RATE YOUR ASTHMA CONTROL: WELL CONTROLLED

## 2024-12-11 ASSESSMENT — PAIN SCALES - GENERAL: PAINLEVEL_OUTOF10: NO PAIN (0)

## 2024-12-11 NOTE — PATIENT INSTRUCTIONS
Treating for UTI and also sending urine for culture.     Can talk to new primary about topical vaginal estrogen for frequent UTI symptoms.

## 2024-12-11 NOTE — PROGRESS NOTES
Assessment & Plan       ICD-10-CM    1. Dysuria  R30.0 UA Macroscopic with reflex to Microscopic and Culture - Clinic Collect     UA Microscopic with Reflex to Culture     Urine Culture     fluconazole (DIFLUCAN) 150 MG tablet      2. Acute UTI (urinary tract infection)  N39.0 nitroFURantoin macrocrystal-monohydrate (MACROBID) 100 MG capsule        --------------------------  PATIENT INSTRUCTIONS    Patient Instructions   Treating for UTI and also sending urine for culture.     Can talk to new primary about topical vaginal estrogen for frequent UTI symptoms.         --------------------------                  Subjective   Jing is a 59 year old, presenting for the following health issues:  UTI      12/11/2024     9:04 AM   Additional Questions   Roomed by Shruti Harman   Accompanied by DENIS     UTI    History of Present Illness       Reason for visit:  Uti  Symptom onset:  1-3 days ago   She is taking medications regularly.       Using monistat cream on the rash in vaginal area  Has gone to [place] for this rash - has gotten down to skin sensitivity  Frequency, urgency, discomfort  No fever or back pain  Gets UTIs quite often  High yeast burden    Recent URI symptoms but thinks she's clearing them okay.     ------        Objective    LMP 01/31/2020 (Approximate)   There is no height or weight on file to calculate BMI.  Physical Exam   GENERAL: alert and no distress  HENT: ear canals and TM's normal, nose and mouth without ulcers or lesions  RESP: lungs clear to auscultation - no rales, rhonchi or wheezes  CV: regular rate and rhythm, normal S1 S2, no S3 or S4, no murmur, click or rub, no peripheral edema    (female): normal female external genitalia, normal urethral meatus, slightly dry vaginal mucosa; mild erythema between vagina and anus.             Signed Electronically by: Benjamin Nguyễn MD  The longitudinal plan of care for the diagnosis(es)/condition(s) as documented were addressed during this visit.  Due to the added complexity in care, I will continue to support Jing in the subsequent management and with ongoing continuity of care.

## 2024-12-12 LAB — BACTERIA UR CULT: ABNORMAL

## 2025-03-29 ENCOUNTER — HEALTH MAINTENANCE LETTER (OUTPATIENT)
Age: 60
End: 2025-03-29